# Patient Record
Sex: MALE | Race: WHITE
[De-identification: names, ages, dates, MRNs, and addresses within clinical notes are randomized per-mention and may not be internally consistent; named-entity substitution may affect disease eponyms.]

---

## 2017-04-27 NOTE — ER DOCUMENT REPORT
ED Extremity Problem, Upper





- General


Chief Complaint: Shoulder Pain


Stated Complaint: RIGHT SHOULDER BACK AND NECK PAIN


Mode of Arrival: Ambulatory


Information source: Patient


TRAVEL OUTSIDE OF THE [place] IN LAST 30 DAYS: No





- HPI


Patient complains to provider of: Pain


Onset: Last week


Notes: 


Patient denies complaints of right scapular pain for the last week.  States 

that he was moving some furniture when he felt a pop in his right scapular area 

and has had intermittent pain and stiffness since then.  He states that 

occasionally when exam the pain when he moves his head or his neck the pain 

seems to be worse.  At times he has no pain at all.  There was no traumatic 

injury or fall.  He denies any numbness, tingling, weakness to the arm or hand.

  He denies any chest pain first breath.  He denies any fevers.  He denies any 

blood thinning medications.  He denies any IV drug use.  No rash.  This pain is 

worse with movement and better with rest.  He denies any other complaints at 

this time.





- Related Data


Allergies/Adverse Reactions: 


 





haloperidol [From Haldol] Allergy (Verified 11/27/15 11:51)


 


haloperidol lactate [From Haldol] Allergy (Verified 11/27/15 11:51)


 











Past Medical History





- Social History


Smoking Status: Unknown if Ever Smoked


Family History: Reviewed & Not Pertinent


Patient has suicidal ideation: No


Patient has homicidal ideation: No


Renal/ Medical History: Denies: Hx Peritoneal Dialysis





- Immunizations


Immunizations up to date: Yes


Hx Diphtheria, Pertussis, Tetanus Vaccination: Yes





Review of Systems





- Review of Systems


-: Yes All other systems reviewed and negative





Physical Exam





- Vital signs


Vitals: 





 











Temp Pulse Resp BP Pulse Ox


 


 98.7 F   81   14   138/76 H  97 


 


 04/27/17 12:52  04/27/17 12:52  04/27/17 12:52  04/27/17 12:52  04/27/17 12:52














- Notes


Notes: 


GENERAL: alert, cooperative, nontoxic, no distress.


HEAD: normocephalic, atraumatic


EYES: conjunctiva pink without discharge, no external redness or swelling.


EARS: no external swelling, no external redness


NOSE: atraumatic, no external swelling


MOUTH/THROAT: mucous membranes moist and pink


NECK: soft, supple, full range of motion, no meningismus.


CHEST: no distress, lungs clear and equal throughout.  No wheezing, rales, 

rhonchi.


CARDIAC: regular rate and rhythm, no murmur, normal capillary refill, normal 

pulses.  


BACK: full range of motion, no CVA tenderness.


EXTREMITIES: full range of motion of all extremities.  No redness, no swelling.

  Patient has some mild tenderness to palpation to the right parascapular area.

  He is full range of motion.  Is 5 out of 5 flexion and extension of his upper 

extremities bilaterally.  He has normal sensation.


NEURO: alert and oriented 3, no focal deficits, full range of motion of all 

extremities.  Bicep/tricep reflexes +2.  Normal sensation.


PYSCH: appropriate mood, affect.  Patient is cooperative.


SKIN: pink, warm, dry, no rash.





Course





- Re-evaluation


Re-evalutation: 





04/27/17 13:34


The patient is nontoxic.  Stable vitals.  Patient's had a right scapular pain 

since moving some furniture about one week ago.  The pain is intermittent.  It 

is worse with touching the area and movement.  He had no trauma.  He has no 

tenderness to the bones.  He has no sign of shoulder dislocation.  X-rays at 

this time are not warranted as there was no traumatic injury.  He has a normal 

neurovascular exam at this time.  He states that he requires a work note for 

today.  This point the patient be discharged home with prescription for 

Voltaren.  He is instructed to apply heat to the sore area.  Follow-up if not 

better in one week, sooner for increased pain, fever, weakness, any further 

concerns.





The patient is noted to have elevated blood pressure during today's emergency 

department visit.  The patient was informed of this finding.  The patient was 

instructed that this may be related to pre-hypertension and requires further 

evaluation with a primary care provider.  The patient has no hypertensive 

symptoms at this time.





The patient's emergency department workup and current diagnosis were explained 

to the patient and or family.  Follow-up instructions were provided.  

Medications if prescribed were discussed. Instructions for when to return to 

the emergency department including specific  worrisome symptoms were discussed 

with the patient and/or family.





- Vital Signs


Vital signs: 





 











Temp Pulse Resp BP Pulse Ox


 


 98.7 F   81   14   138/76 H  97 


 


 04/27/17 12:52  04/27/17 12:52  04/27/17 12:52  04/27/17 12:52  04/27/17 12:52














Discharge





- Discharge


Clinical Impression: 


Muscle strain of scapular region


Qualifiers:


 Encounter type: initial encounter Laterality: right Qualified Code(s): 

S46.911A - Strain of unspecified muscle, fascia and tendon at shoulder and 

upper arm level, right arm, initial encounter





Condition: Stable


Disposition: HOME, SELF-CARE


Instructions:  Muscle Strain ([facility])


Additional Instructions: 


Take medications as prescribed.  Plenty disoriented.  Follow-up with not better 

in one week, sooner for increased pain, fever, numbness, tingling, weakness, 

any further concerns.





Your blood pressure was elevated during today's visit.  Have this rechecked 

with your doctor.


Prescriptions: 


Diclofenac Sodium [Voltaren] 75 mg PO BID #20 tablet.dr


Forms:  Return to Work

## 2018-04-30 NOTE — ER DOCUMENT REPORT
ED GI/





- General


Chief Complaint: Flank Pain


Stated Complaint: RIGHT FLANK PAIN


Time Seen by Provider: 04/30/18 13:47


Mode of Arrival: Ambulatory


Information source: Patient


Notes: 





27-year-old male with right flank pain since Thursday gets sharper at times.  

No radiation of the pain.  No penis or testicle pain or swelling.  Is difficult 

to urinate at times.  No fever or chills.  No nausea vomiting or diarrhea.  No 

constipation.  Does have some cough but no chest pain or shortness of breath.  

No saddle anesthesia.  He states he does have some tingling in his bilateral 

thighs at times.


TRAVEL OUTSIDE OF THE [place] IN LAST 30 DAYS: No





- Related Data


Allergies/Adverse Reactions: 


 





haloperidol [From Haldol] Allergy (Verified 04/30/18 13:34)


 


haloperidol lactate [From Haldol] Allergy (Verified 04/30/18 13:34)


 











Past Medical History





- General


Information source: Patient





- Social History


Smoking Status: Never Smoker


Chew tobacco use (# tins/day): No


Frequency of alcohol use: None


Drug Abuse: None


Lives with: Family


Family History: Reviewed & Not Pertinent


Patient has suicidal ideation: No


Patient has homicidal ideation: No





- Medical History


Medical History: Negative


Renal/ Medical History: Denies: Hx Peritoneal Dialysis


Surgical Hx: Negative





- Immunizations


Immunizations up to date: Yes


Hx Diphtheria, Pertussis, Tetanus Vaccination: Yes





Review of Systems





- Review of Systems


Constitutional: No symptoms reported


EENT: No symptoms reported


Cardiovascular: No symptoms reported


Respiratory: No symptoms reported


Gastrointestinal: See HPI


Genitourinary: No symptoms reported


Male Genitourinary: No symptoms reported


Musculoskeletal: No symptoms reported


Skin: No symptoms reported


Hematologic/Lymphatic: No symptoms reported


Neurological/Psychological: No symptoms reported





Physical Exam





- Vital signs


Vitals: 


 











Temp Pulse Resp BP Pulse Ox


 


 99.1 F   78   16   135/67 H  97 


 


 04/30/18 12:46  04/30/18 12:46  04/30/18 12:46  04/30/18 12:46  04/30/18 12:46











Interpretation: Normal





- General


General appearance: Appears well, Alert


In distress: None





- HEENT


Head: Normocephalic, Atraumatic


Eyes: Normal


Conjunctiva: Normal


Pupils: PERRL


Neck: Supple.  No: Lymphadenopathy





- Respiratory


Respiratory status: No respiratory distress


Chest status: Nontender


Breath sounds: Normal


Chest palpation: Normal





- Cardiovascular


Rhythm: Regular


Heart sounds: Normal auscultation


Murmur: No





- Abdominal


Inspection: Normal


Distension: No distension


Bowel sounds: Normal


Tenderness: Nontender.  No: Tender


Organomegaly: No organomegaly





- Back


Back: Normal, Nontender.  No: Tender, CVA tenderness





- Extremities


General upper extremity: Normal inspection, Nontender, Normal color, Normal ROM

, Normal temperature


General lower extremity: Normal inspection, Nontender, Normal color, Normal ROM

, Normal temperature, Normal weight bearing.  No: Yi's sign





- Neurological


Neuro grossly intact: Yes


Cognition: Normal


Orientation: AAOx4


[place] Coma Scale Eye Opening: Spontaneous


[place] Coma Scale Verbal: Oriented


[place] Coma Scale Motor: Obeys Commands


[place] Coma Scale Total: 15


Speech: Normal


Motor strength normal: LUE, RUE, LLE, RLE


Sensory: Normal





- Psychological


Associated symptoms: Normal affect, Normal mood





- Skin


Skin Temperature: Warm


Skin Moisture: Dry


Skin Color: Normal


Skin irregularity: negative: Rash





Course





- Re-evaluation


Re-evalutation: 





04/30/18 16:30


Labs negative, mildly enlarged spleen on the CT scan 13 cm in greatest 

craniocaudal diameter will have him follow-up with the family practice doctor 

about this, and tell him no sports that could injure the spleen, suspect 

mechanical back pain.


04/30/18 16:35





04/30/18 16:35








- Vital Signs


Vital signs: 


 











Temp Pulse Resp BP Pulse Ox


 


 99.1 F   78   16   135/67 H  97 


 


 04/30/18 12:46  04/30/18 12:46  04/30/18 12:46  04/30/18 12:46  04/30/18 12:46














- Laboratory


Result Diagrams: 


 04/30/18 14:09





 04/30/18 14:09


Laboratory results interpreted by me: 


 











  04/30/18 04/30/18 04/30/18





  14:09 14:09 14:09


 


RBC  5.56 H  


 


Band Neutrophils %  2 L  


 


Sodium   145.9 H 


 


Direct Bilirubin   0.5 H 


 


Urine Ascorbic Acid    40 H














Discharge





- Discharge


Clinical Impression: 


 Right flank pain, mild splenomegaly





Condition: Good


Disposition: HOME, SELF-CARE


Instructions:  Low Back Pain ([facility]), Flank Pain ([facility]), Acetaminophen, Ibuprofen (

General) ([facility]), Muscle Relaxers ([facility])


Additional Instructions: 


Motrin


Tylenol


Warm compress


No sports or activities that could injure the spleen on her left side of your 

abdomen below the ribs


see family practice doctor for follow-up


Return to the emergency room if symptoms


Copy of lab work and ct report given to you


mild enlarged spleen on CT report- see family practice doctor for recheck in 1 

week, sooner if worse


Prescriptions: 


Ibuprofen [Motrin 800 mg Tablet] 800 mg PO Q8HP PRN #30 tablet


 PRN Reason: 


Cyclobenzaprine HCl [Flexeril 10 Mg Tablet] 10 mg PO TIDP PRN #20 tablet


 PRN Reason: 


Forms:  Return to Work


Referrals: 


FELIPA SHULTZ MD [ACTIVE STAFF] - Follow up as needed

## 2018-04-30 NOTE — ER DOCUMENT REPORT
ED Medical Screen (RME)





- General


Chief Complaint: Flank Pain


Stated Complaint: RIGHT FLANK PAIN


Time Seen by Provider: 04/30/18 13:47


Notes: 





Patient presents with 2 weeks of right flank pain.  He states that he is having 

some trouble getting urine started.  No vomiting.  States she does not know of 

any injuries.


TRAVEL OUTSIDE OF THE [place] IN LAST 30 DAYS: No





- Related Data


Allergies/Adverse Reactions: 


 





haloperidol [From Haldol] Allergy (Verified 04/30/18 13:34)


 


haloperidol lactate [From Haldol] Allergy (Verified 04/30/18 13:34)


 











Past Medical History





- Social History


Chew tobacco use (# tins/day): No


Frequency of alcohol use: None


Drug Abuse: None


Renal/ Medical History: Denies: Hx Peritoneal Dialysis





- Immunizations


Immunizations up to date: Yes


Hx Diphtheria, Pertussis, Tetanus Vaccination: Yes





Physical Exam





- Vital signs


Vitals: 





 











Temp Pulse Resp BP Pulse Ox


 


 99.1 F   78   16   135/67 H  97 


 


 04/30/18 12:46  04/30/18 12:46  04/30/18 12:46  04/30/18 12:46  04/30/18 12:46














Course





- Vital Signs


Vital signs: 





 











Temp Pulse Resp BP Pulse Ox


 


 99.1 F   78   16   135/67 H  97 


 


 04/30/18 12:46  04/30/18 12:46  04/30/18 12:46  04/30/18 12:46  04/30/18 12:46

## 2018-04-30 NOTE — RADIOLOGY REPORT (SQ)
EXAM DESCRIPTION:  CT ABD/PELVIS NO ORAL OR IV



COMPLETED DATE/TIME:  4/30/2018 3:39 pm



REASON FOR STUDY:  pain left flank



COMPARISON:  None.



TECHNIQUE:  CT scan of the abdomen and pelvis performed without intravenous or oral contrast. Images 
reviewed with lung, soft tissue, and bone windows. Reconstructed coronal and sagittal MPR images revi
ewed. All images stored on PACS.

All CT scanners at this facility use dose modulation, iterative reconstruction, and/or weight based d
osing when appropriate to reduce radiation dose to as low as reasonably achievable (ALARA).

[facility]: Dose Right  [facility]: CareDose    [facility]: Dose Right    [facility]: Teradose 4D    [facility]: [employer]



RADIATION DOSE:  CT Rad equipment meets quality standard of care and radiation dose reduction techniq
ues were employed. CTDIvol: 14.5 mGy. DLP: 892 mGy-cm.mGy.



LIMITATIONS:  None.



FINDINGS:  LOWER CHEST: No significant findings. No nodules or infiltrates.

NON-CONTRASTED LIVER, SPLEEN, ADRENALS: Normal size liver.  No masses or biliary ductal dilatation on
 non contrasted CT scan.  Spleen is mildly enlarged, 14 cm in greatest craniocaudad diameter.  No foc
al lesions by noncontrast CT.  Adrenal glands unremarkable.

PANCREAS: No masses. No peripancreatic inflammatory changes.

GALLBLADDER: No identified stones by CT criteria. No inflammatory changes to suggest cholecystitis.

RIGHT KIDNEY AND URETER: No suspicious masses. Assessment limited by lack of IV contrast.   No signif
icant calcifications.   No hydronephrosis or hydroureter.

LEFT KIDNEY AND URETER: No suspicious masses. Assessment limited by lack of IV contrast.   No signifi
cant calcifications.   No hydronephrosis or hydroureter.

AORTA AND RETROPERITONEUM: No aneurysm. No retroperitoneal masses or adenopathy.

BOWEL AND PERITONEAL CAVITY: No obvious masses or inflammatory changes. No free fluid.

APPENDIX: Normal retrocecal appendix sagittal image 27

PELVIS, BLADDER, AND ABDOMINAL WALL:No abnormal masses. No free fluid. Bladder normal.

BONES: No significant findings.

OTHER: No other significant finding.



IMPRESSION:  No acute findings.

Mild splenomegaly



COMMENT:  Quality ID # [id number]: Final reports with documentation of one or more dose reduction techniques
 (e.g., Automated exposure control, adjustment of the mA and/or kV according to patient size, use of 
iterative reconstruction technique)



TECHNICAL DOCUMENTATION:  JOB ID:  [id number]

 2011 [employer]- All Rights Reserved



Reading location - IP/workstation name: [email or web address]
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
## 2019-11-26 NOTE — ER DOCUMENT REPORT
ED General





- General


Chief Complaint: Flank Pain


Stated Complaint: ABDOMINAL PAIN


Time Seen by Provider: 11/26/19 11:24


Mode of Arrival: Ambulatory


Information source: Patient


Notes: 





Patient presents complaining of some pressure in his lower abdomen as well as 

some dysuria.  States he is also noticed greenish discharge from his penis.  

States he is worried he may have a cyclic transmitted disease.  States he has no

previous history of cyclic transmitted disease.  No testicle pain.  No vomiting 

or diarrhea.  No rashes or lesions.  Patient's discomfort has been intermittent.

 It is worse with urinating and better without.  It does radiate across the 

lower abdomen.  It is been mild to moderate in intensity.


TRAVEL OUTSIDE OF THE [place] IN LAST 30 DAYS: No





- Related Data


Allergies/Adverse Reactions: 


                                        





haloperidol [From Haldol] Allergy (Verified 11/26/19 11:25)


   


haloperidol lactate [From Haldol] Allergy (Verified 11/26/19 11:25)


   











Past Medical History





- General


Information source: Patient





- Social History


Smoking Status: Current Every Day Smoker


Chew tobacco use (# tins/day): No


Frequency of alcohol use: None


Drug Abuse: None


Family History: Reviewed & Not Pertinent


Patient has suicidal ideation: No


Patient has homicidal ideation: No


Renal/ Medical History: Denies: Hx Peritoneal Dialysis





- Immunizations


Immunizations up to date: Yes


Hx Diphtheria, Pertussis, Tetanus Vaccination: Yes





Review of Systems





- Review of Systems


Constitutional: denies: Chills, Fever


Cardiovascular: denies: Chest pain, Palpitations


Respiratory: denies: Cough, Short of breath


-: Yes All other systems reviewed and negative - Okay





Physical Exam





- Vital signs


Vitals: 





                                        











Temp Pulse Resp BP Pulse Ox


 


 98.1 F   81   16   146/84 H  97 


 


 11/26/19 11:23  11/26/19 11:23  11/26/19 11:23  11/26/19 11:23  11/26/19 11:23











Interpretation: Normal





- General


General appearance: Appears well, Alert





- HEENT


Head: Normocephalic, Atraumatic


Eyes: Normal


Pupils: PERRL





- Respiratory


Respiratory status: No respiratory distress


Chest status: Nontender


Breath sounds: Normal


Chest palpation: Normal





- Cardiovascular


Rhythm: Regular


Heart sounds: Normal auscultation


Murmur: No





- Abdominal


Inspection: Normal


Distension: No distension


Bowel sounds: Normal


Tenderness: Nontender


Organomegaly: No organomegaly





- Genitourinary


Inspection: Normal


Tenderness: Nontender


Cremasteric reflex: Normal


Scrotum: Normal





- Back


Back: Normal, Nontender





- Extremities


General upper extremity: Normal inspection, Nontender, Normal color, Normal ROM,

Normal temperature


General lower extremity: Normal inspection, Nontender, Normal color, Normal ROM,

Normal temperature, Normal weight bearing.  No: Yi's sign





- Neurological


Neuro grossly intact: Yes


Cognition: Normal


Orientation: AAOx4


[place]rika Coma Scale Eye Opening: Spontaneous


Erika Coma Scale Verbal: Oriented


Erika Coma Scale Motor: Obeys Commands


[place] Coma Scale Total: 15


Speech: Normal


Motor strength normal: LUE, RUE, LLE, RLE


Sensory: Normal





- Psychological


Associated symptoms: Normal affect, Normal mood





- Skin


Skin Temperature: Warm


Skin Moisture: Dry


Skin Color: Normal





Course





- Re-evaluation


Re-evalutation: 





11/26/19 14:19


Patient presents with history of penile discharge.  He has an unremarkable exam 

however.  He has a normal UA.  No discharge on my exam.  Vital signs are stable 

labs are otherwise unremarkable.  I will treat the patient for STD while we 

await cultures.





- Vital Signs


Vital signs: 





                                        











Temp Pulse Resp BP Pulse Ox


 


 98.1 F   81   16   146/84 H  97 


 


 11/26/19 11:23  11/26/19 11:23  11/26/19 11:23  11/26/19 11:23  11/26/19 11:23














- Laboratory


Result Diagrams: 


                                 11/26/19 11:43





                                 11/26/19 11:43


Laboratory results interpreted by me: 





                                        











  11/26/19 11/26/19





  11:43 11:43


 


RBC  5.97 H 


 


Hgb  17.8 H 


 


Hct  51.2 H 


 


Chloride   110 H


 


Carbon Dioxide   20 L


 


Alkaline Phosphatase   37 L


 


Total Protein   6.2 L














- Diagnostic Test


Radiology reviewed: Image reviewed, Reports reviewed





Discharge





- Discharge


Clinical Impression: 


 Urethritis





Condition: Stable


Disposition: HOME, SELF-CARE


Instructions:  Urethritis ([facility])


Additional Instructions: 


Please call your family physician as soon as possible to arrange follow-up

## 2019-11-26 NOTE — ER DOCUMENT REPORT
ED Medical Screen (RME)





- General


Chief Complaint: Abdominal Pain


Stated Complaint: ABDOMINAL PAIN


Time Seen by Provider: 11/26/19 11:24


Mode of Arrival: Ambulatory


Information source: Patient


Notes: 





29-year-old male presented to ED for complaint of pain with urination and right 

flank pain.  He states his been going on for about 4 5 days.  He states it is 

getting worse.  He does not have any nausea or vomiting.  He states he has had 

kidney failure in the past and so he is here because he is very concerned of 

what is going on.  We will get blood urine and a renal ultrasound and have him e

xamined by another provider.  Bowel sounds are active patient does have 

tenderness to the right pelvic and flank area

















I have greeted and performed a rapid initial assessment of this patient.  A c

omprehensive ED assessment and evaluation of the patient, analysis of test 

results and completion of medical decision making process will be conducted by 

an additional ED providers.


TRAVEL OUTSIDE OF THE [place] IN LAST 30 DAYS: No





- Related Data


Allergies/Adverse Reactions: 


                                        





haloperidol [From Haldol] Allergy (Verified 11/26/19 11:25)


   


haloperidol lactate [From Haldol] Allergy (Verified 11/26/19 11:25)


   











Past Medical History


Renal/ Medical History: Denies: Hx Peritoneal Dialysis





- Immunizations


Immunizations up to date: Yes


Hx Diphtheria, Pertussis, Tetanus Vaccination: Yes





Physical Exam





- Vital signs


Vitals: 





                                        











Temp Pulse Resp BP Pulse Ox


 


 98.1 F   81   16   146/84 H  97 


 


 11/26/19 11:23  11/26/19 11:23  11/26/19 11:23  11/26/19 11:23  11/26/19 11:23














Course





- Vital Signs


Vital signs: 





                                        











Temp Pulse Resp BP Pulse Ox


 


 98.1 F   81   16   146/84 H  97 


 


 11/26/19 11:23  11/26/19 11:23  11/26/19 11:23  11/26/19 11:23  11/26/19 11:23

## 2019-11-26 NOTE — RADIOLOGY REPORT (SQ)
EXAM DESCRIPTION:  U/S RETROPERITON (RENAL/AORTA)



COMPLETED DATE/TIME:  11/26/2019 12:19 pm



REASON FOR STUDY:  right pelvic and flank pain



COMPARISON:  None.



TECHNIQUE:  Dynamic and static grayscale images acquired of the kidneys and bladder and recorded on P
ACS. Additional selected color Doppler and spectral images recorded.



LIMITATIONS:  None.



FINDINGS:  RIGHT KIDNEY: Normal size measuring 11.1 cm. Normal echogenicity. No solid or suspicious m
asses. No hydronephrosis.  Echogenic focus within the interpolar region with shadowing and twinkle ar
tifact compatible with nonobstructing stone.  Probable 7 mm cortical cyst on the inferior pole

LEFT KIDNEY:  Normal size measuring 10.6 cm. Normal echogenicity. No solid or suspicious masses. No h
ydronephrosis. No calcifications.

BLADDER: No masses.

OTHER FINDINGS: No other significant finding.



IMPRESSION:  1.  No hydronephrosis.

2.  Nonobstructing right interpolar punctate stone.



TECHNICAL DOCUMENTATION:  JOB ID:  [id number]

 2011 [employer]- All Rights Reserved



Reading location - IP/workstation name: [email or web address]

## 2020-04-07 ENCOUNTER — HOSPITAL ENCOUNTER (EMERGENCY)
Dept: HOSPITAL 62 - ER | Age: 30
LOS: 1 days | Discharge: HOME | End: 2020-04-08
Payer: SELF-PAY

## 2020-04-07 DIAGNOSIS — R11.2: Primary | ICD-10-CM

## 2020-04-07 DIAGNOSIS — R06.02: ICD-10-CM

## 2020-04-07 PROCEDURE — 96361 HYDRATE IV INFUSION ADD-ON: CPT

## 2020-04-07 PROCEDURE — 80053 COMPREHEN METABOLIC PANEL: CPT

## 2020-04-07 PROCEDURE — 83690 ASSAY OF LIPASE: CPT

## 2020-04-07 PROCEDURE — 36415 COLL VENOUS BLD VENIPUNCTURE: CPT

## 2020-04-07 PROCEDURE — 71045 X-RAY EXAM CHEST 1 VIEW: CPT

## 2020-04-07 PROCEDURE — 85025 COMPLETE CBC W/AUTO DIFF WBC: CPT

## 2020-04-07 PROCEDURE — 96374 THER/PROPH/DIAG INJ IV PUSH: CPT

## 2020-04-07 PROCEDURE — 99284 EMERGENCY DEPT VISIT MOD MDM: CPT

## 2020-04-07 PROCEDURE — 80074 ACUTE HEPATITIS PANEL: CPT

## 2020-04-07 NOTE — ER DOCUMENT REPORT
ED General





- General


Chief Complaint: Nausea/Vomiting


Stated Complaint: SHORTNESS OF BREATH


Time Seen by Provider: 04/07/20 23:01


Notes: 





Patient is a 29-year-old male that comes emergency department for chief 

complaint of sick symptoms for the past 2 days.  He states he vomited 10 times, 

he vomited some blood and became concerned and came in for evaluation.  Patient 

does admit when asked that he did also have a nosebleed over the past couple of 

days.  He denies sinus congestion, sore throat, and he denies any particular 

cough.  He states he feels weak and slightly short of breath.  He denies fevers.

 He denies any obvious sick contacts.  He denies any daily medications, 

surgeries, or diagnosed medical history.


TRAVEL OUTSIDE OF THE [place] IN LAST 30 DAYS: No





- Related Data


Allergies/Adverse Reactions: 


                                        





haloperidol [From Haldol] Allergy (Verified 11/26/19 11:25)


   


haloperidol lactate [From Haldol] Allergy (Verified 11/26/19 11:25)


   











Past Medical History





- General


Information source: Patient





- Social History


Smoking Status: Former Smoker


Frequency of alcohol use: Social


Drug Abuse: None


Lives with: Family


Family History: Reviewed & Not Pertinent


Patient has suicidal ideation: No


Patient has homicidal ideation: No


Renal/ Medical History: Denies: Hx Peritoneal Dialysis


Surgical Hx: Negative





- Immunizations


Immunizations up to date: Yes


Hx Diphtheria, Pertussis, Tetanus Vaccination: Yes





Review of Systems





- Review of Systems


Constitutional: See HPI


EENT: See HPI


Cardiovascular: No symptoms reported


Respiratory: See HPI


Gastrointestinal: See HPI


Genitourinary: No symptoms reported


Male Genitourinary: No symptoms reported


Musculoskeletal: No symptoms reported


Skin: No symptoms reported


Hematologic/Lymphatic: No symptoms reported


Neurological/Psychological: No symptoms reported





Physical Exam





- Vital signs


Vitals: 


                                        











Temp Pulse Resp BP Pulse Ox


 


 97.8 F   96   18   127/80 H  96 


 


 04/07/20 21:54  04/07/20 21:54  04/07/20 21:54  04/07/20 21:54  04/07/20 21:54














- Notes


Notes: 





GENERAL: Alert, interacts well. No acute distress.


HEAD: Normocephalic, atraumatic.


EYES: Pupils equal, round, and reactive to light. Extraocular movements intact.


ENT: Oral mucosa very dry, tongue midline. Oropharynx unremarkable. Airway 

patent. Nares patent with small amount of dried epistaxis in the mid right nasal

passage, no current bleeding.  No blood in the posterior pharynx.  Sinuses non-

tender, ear canals unremarkable, TM's intact.


NECK: Full range of motion. Supple. Trachea midline. No lymphadenopathy.


LUNGS: Clear to auscultation bilaterally, no wheezes, rales, or rhonchi. No 

respiratory distress. Non-tender chest wall. 


HEART: Regular rate and rhythm. No murmur


ABDOMEN: Soft, non-tender. Non-distended.  No guarding or rigidity.  Bowel 

sounds present in all 4 quadrants.


GENITOURINARY: Deferred


EXTREMITIES: Moves all 4 extremities spontaneously. No edema, normal radial and 

dorsalis pedis pulses bilaterally. No cyanosis.


BACK: no cervical, thoracic, lumbar midline tenderness. No saddle anesthesia, 

normal distal neurovascular exam. Moves all extremities in full range of motion.


NEUROLOGICAL: Alert and oriented x3. Normal speech. Cranial nerves II through 

XII grossly intact. Strength 5/5 in all extremities. 


PSYCH: Flat affect but cooperative.


SKIN: Warm, dry, normal turgor. No rashes or lesions noted.





Course





- Re-evaluation


Re-evalutation: 


Patient is a difficult historian but he is well-appearing on exam.  Vital signs 

unremarkable.  His abdomen is nontender.  He does have a very dry mucous 

membranes however, he also has old dried epistaxis noted on the right nasal 

passage but no current bleeding. He states he gets fairly frequent nosebleeds, 

this is not new or concerning to him. Clear lungs with no cough.  No fever.





Chest x-ray negative.  CBC unremarkable, chemistry shows slightly elevated 

indirect bilirubin but normal direct bilirubin, normal lipase, slightly elevated

LFTs with ALT greater than AST.  Patient again denies alcohol.  On reevaluation 

patient had taken GI cocktail after rehydration, he states he feels good he just

wants to sleep.  Has no flank pain, abdominal pain, or additional symptoms.  I 

discussed with him, decision was made to perform hepatitis profile which will be

pending, and generalized but this is viral and should resolve with time.  Ivelisse

ent with no abnormalities on reevaluation, asymptomatic, tolerated p.o. without 

difficulty.  Discussed return precautions and follow-up.  Patient states 

understanding and agreement.  Stable and well-appearing at time of discharge.








- Vital Signs


Vital signs: 


                                        











Temp Pulse Resp BP Pulse Ox


 


 97.8 F   96   18   127/80 H  96 


 


 04/07/20 21:54  04/07/20 21:54  04/07/20 21:54  04/07/20 21:54  04/07/20 21:54














- Laboratory


Result Diagrams: 


                                 04/08/20 00:50





                                 04/08/20 00:50


Laboratory results interpreted by me: 


                                        











  04/08/20





  00:50


 


Sodium  135.2 L


 


BUN  37 H


 


Total Bilirubin  2.3 H


 


AST  62 H


 


ALT  120 H














Discharge





- Discharge


Clinical Impression: 


Vomiting


Qualifiers:


 Vomiting type: unspecified Vomiting Intractability: non-intractable Nausea pr

esence: with nausea Qualified Code(s): R11.2 - Nausea with vomiting, unspecified





Condition: Stable


Disposition: HOME, SELF-CARE


Additional Instructions: 


You have been treated for vomiting and dehydration.  This is most likely viral 

and should resolve with time.  We do have a hepatitis panel pending because of 

your elevated liver function tests, you will be contacted for any concerning 

results.  Follow-up with primary care to have your liver function tests 

rechecked as well.





I recommend you take the antinausea medication as prescribed, over-the-counter 

medicines such as famotidine, start with bland food, initially avoid smoking, 

alcohol, spicy food, NSAIDs.





Return if you worsen including developing abdominal pain, difficulty breathing, 

spiking fever, uncontrolled vomiting, or any other concerning or worsening 

symptoms.


Prescriptions: 


Promethazine HCl [Phenergan 25 mg Tablet] 25 mg PO Q6H PRN #20 tablet


 PRN Reason: 


Forms:  Return to Work

## 2020-04-08 VITALS — SYSTOLIC BLOOD PRESSURE: 131 MMHG | DIASTOLIC BLOOD PRESSURE: 79 MMHG

## 2020-04-08 LAB
ADD MANUAL DIFF: NO
ALBUMIN SERPL-MCNC: 4.7 G/DL (ref 3.5–5)
ALP SERPL-CCNC: 58 U/L (ref 38–126)
ANION GAP SERPL CALC-SCNC: 10 MMOL/L (ref 5–19)
AST SERPL-CCNC: 62 U/L (ref 17–59)
BASOPHILS # BLD AUTO: 0 10^3/UL (ref 0–0.2)
BASOPHILS NFR BLD AUTO: 0.6 % (ref 0–2)
BILIRUB DIRECT SERPL-MCNC: 0.4 MG/DL (ref 0–0.4)
BILIRUB SERPL-MCNC: 2.3 MG/DL (ref 0.2–1.3)
BUN SERPL-MCNC: 37 MG/DL (ref 7–20)
CALCIUM: 9 MG/DL (ref 8.4–10.2)
CHLORIDE SERPL-SCNC: 100 MMOL/L (ref 98–107)
CO2 SERPL-SCNC: 25 MMOL/L (ref 22–30)
EOSINOPHIL # BLD AUTO: 0.1 10^3/UL (ref 0–0.6)
EOSINOPHIL NFR BLD AUTO: 1.3 % (ref 0–6)
ERYTHROCYTE [DISTWIDTH] IN BLOOD BY AUTOMATED COUNT: 13.1 % (ref 11.5–14)
GLUCOSE SERPL-MCNC: 103 MG/DL (ref 75–110)
HCT VFR BLD CALC: 43.3 % (ref 37.9–51)
HGB BLD-MCNC: 15.2 G/DL (ref 13.5–17)
LYMPHOCYTES # BLD AUTO: 1.7 10^3/UL (ref 0.5–4.7)
LYMPHOCYTES NFR BLD AUTO: 22.4 % (ref 13–45)
MCH RBC QN AUTO: 29.8 PG (ref 27–33.4)
MCHC RBC AUTO-ENTMCNC: 35 G/DL (ref 32–36)
MCV RBC AUTO: 85 FL (ref 80–97)
MONOCYTES # BLD AUTO: 0.9 10^3/UL (ref 0.1–1.4)
MONOCYTES NFR BLD AUTO: 11.2 % (ref 3–13)
NEUTROPHILS # BLD AUTO: 4.9 10^3/UL (ref 1.7–8.2)
NEUTS SEG NFR BLD AUTO: 64.5 % (ref 42–78)
PLATELET # BLD: 252 10^3/UL (ref 150–450)
POTASSIUM SERPL-SCNC: 3.9 MMOL/L (ref 3.6–5)
PROT SERPL-MCNC: 7.9 G/DL (ref 6.3–8.2)
RBC # BLD AUTO: 5.08 10^6/UL (ref 4.35–5.55)
TOTAL CELLS COUNTED % (AUTO): 100 %
WBC # BLD AUTO: 7.6 10^3/UL (ref 4–10.5)

## 2020-04-08 NOTE — RADIOLOGY REPORT (SQ)
EXAM DESCRIPTION:

XR CHEST 1 VIEW



COMPLETED DATE/TME:  04/07/2020 23:45



CLINICAL HISTORY:

29 years Male, shortness of breath



COMPARISON:

None.



NUMBER OF VIEWS/TECHNIQUE:

1/AP 



FINDINGS:



Adequate lung volume, clear parenchyma, normal cardiac

silhouette, and intact bony thorax.



IMPRESSION:



No acute cardiopulmonary findings.

## 2020-04-09 LAB — HEPATITIS C VIRUS ANTIBODY: 2.5 S/CO RATIO (ref 0–0.9)
